# Patient Record
Sex: FEMALE | Race: WHITE | NOT HISPANIC OR LATINO | URBAN - METROPOLITAN AREA
[De-identification: names, ages, dates, MRNs, and addresses within clinical notes are randomized per-mention and may not be internally consistent; named-entity substitution may affect disease eponyms.]

---

## 2017-06-14 ENCOUNTER — EMERGENCY (EMERGENCY)
Facility: HOSPITAL | Age: 42
LOS: 1 days | Discharge: HOME | End: 2017-06-14
Attending: EMERGENCY MEDICINE | Admitting: EMERGENCY MEDICINE
Payer: MEDICAID

## 2017-06-14 VITALS
RESPIRATION RATE: 16 BRPM | SYSTOLIC BLOOD PRESSURE: 99 MMHG | DIASTOLIC BLOOD PRESSURE: 64 MMHG | TEMPERATURE: 98 F | HEART RATE: 76 BPM | OXYGEN SATURATION: 99 %

## 2017-06-14 PROCEDURE — 99284 EMERGENCY DEPT VISIT MOD MDM: CPT | Mod: 25

## 2017-06-14 PROCEDURE — 73610 X-RAY EXAM OF ANKLE: CPT | Mod: 26,RT

## 2017-06-14 PROCEDURE — 73630 X-RAY EXAM OF FOOT: CPT

## 2017-06-14 PROCEDURE — 99283 EMERGENCY DEPT VISIT LOW MDM: CPT

## 2017-06-14 PROCEDURE — 73610 X-RAY EXAM OF ANKLE: CPT

## 2017-06-14 PROCEDURE — 73630 X-RAY EXAM OF FOOT: CPT | Mod: 26,RT

## 2017-06-14 RX ORDER — IBUPROFEN 200 MG
600 TABLET ORAL ONCE
Qty: 0 | Refills: 0 | Status: COMPLETED | OUTPATIENT
Start: 2017-06-14 | End: 2017-06-14

## 2017-06-14 RX ADMIN — Medication 600 MILLIGRAM(S): at 13:57

## 2017-06-14 NOTE — ED PROVIDER NOTE - PLAN OF CARE
REst, increase activity as tolerated. Ice packs to all sore areas for 20 min at a time. Return to the ER for any concerns. such as uncontrolled pain. Dr. Browning [phone]

## 2017-06-14 NOTE — ED PROVIDER NOTE - CARE PLAN
Instructions for follow-up, activity and diet:	REst, increase activity as tolerated. Ice packs to all sore areas for 20 min at a time. Return to the ER for any concerns. such as uncontrolled pain. Dr. Brownnig [phone] Instructions for follow-up, activity and diet:	REst, increase activity as tolerated. Ice packs to all sore areas for 20 min at a time. Return to the ER for any concerns. such as uncontrolled pain. Dr. Browning [phone] Principal Discharge DX:	Ankle injury, right, initial encounter  Instructions for follow-up, activity and diet:	REst, increase activity as tolerated. Ice packs to all sore areas for 20 min at a time. Return to the ER for any concerns. such as uncontrolled pain. Dr. Browning [phone] Principal Discharge DX:	Ankle injury, right, initial encounter  Instructions for follow-up, activity and diet:	REst, increase activity as tolerated. Ice packs to all sore areas for 20 min at a time. Return to the ER for any concerns. such as uncontrolled pain. Dr. Browning [phone]

## 2017-06-14 NOTE — ED PROCEDURE NOTE - CPROC ED POST PROC CARE GUIDE1
Instructed patient/caregiver regarding signs and symptoms of infection./Keep the cast/splint/dressing clean and dry./Elevate the injured extremity as instructed./Instructed patient/caregiver to follow-up with primary care physician./Verbal/written post procedure instructions were given to patient/caregiver.

## 2017-06-14 NOTE — ED PROVIDER NOTE - OBJECTIVE STATEMENT
41 yo female mostly Israeli speaking in room 6 accompanied by her  for evaluation of right ankle and foot pain s/p inversion injury yesterday.   states "She stepped off the curb yesterday and twisted her ankle. She was able to walk on it but with pain. Pt reports moderate pain to lateral and medial ankle and lateral foot. No pain to upper leg or knee. Denies neck and back pain. LMP 5/23. 43 yo female mostly [place] speaking in room 6 accompanied by her  for evaluation of right ankle and foot pain s/p inversion injury yesterday.   states "She stepped off the curb yesterday and twisted her ankle. She was able to walk on it but with pain. Pt reports moderate pain to lateral and medial ankle and lateral foot. No pain to upper leg or knee. Denies neck and back pain. LMP 5/23.       Attending. Patient was seen in fast track room #6. Agree with above. Patient had an inversion injury to her right ankle yesterday. Patient is complaining of medial or lateral ankle tenderness. Patient denies a numbness or paresthesia. Patient is able to weight-bear.

## 2017-06-14 NOTE — ED PROVIDER NOTE - PHYSICAL EXAMINATION
Attending note. Patient is alert and in acute distress. Examination of the right lower strep he reveals no significant swelling or deformity. Proximal leg is nontender. Squeeze test is negative. Belly L. and nontender. Achilles is nontender. Patient has tenderness over the ATFL greater than the CFL. Patient has slight tenderness over the deltoid ligament. There is no tenderness of the base of the fifth metatarsal. It is nontender. Sensation is normal. Skin is normal except for some slight ecchymosis over the insertion of the ATFL.

## 2017-06-14 NOTE — ED ADULT NURSE NOTE - OBJECTIVE STATEMENT
43yo f a&ox4 ambulated into ED c/o R ankle pain s/p having a mis-step yesterday while ambulating outside on concrete floor. pt c/o difficulty bearing weight, +pulses, +movement of toes, +sensation in b/l feet

## 2017-06-14 NOTE — ED PROVIDER NOTE - ATTENDING CONTRIBUTION TO CARE
. I performed a history and physical exam of the patient and discussed their management with the resident. I reviewed the resident's note and agree with the documented findings and plan of care.  attn - see MDM

## 2017-06-14 NOTE — ED PROVIDER NOTE - MEDICAL DECISION MAKING DETAILS
s/p inversion injury - ankle and foot pain motrin given s/p inversion injury - ankle and foot pain motrin given      Attending note-right ankle sprain, x-ray to rule out fracture.

## 2019-09-27 NOTE — ED ADULT NURSE NOTE - NS ED NURSE RECORD ANOTHER VITAL SIGN
Yes Detail Level: Detailed Patient will use prescription Tretinoin and AhaBaha Cleanser. Patient is done with Accutane.